# Patient Record
Sex: MALE | Race: WHITE | NOT HISPANIC OR LATINO
[De-identification: names, ages, dates, MRNs, and addresses within clinical notes are randomized per-mention and may not be internally consistent; named-entity substitution may affect disease eponyms.]

---
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
## 2024-08-22 ENCOUNTER — NON-APPOINTMENT (OUTPATIENT)
Age: 19
End: 2024-08-22

## 2024-08-23 ENCOUNTER — APPOINTMENT (OUTPATIENT)
Dept: DERMATOLOGY | Facility: CLINIC | Age: 19
End: 2024-08-23
Payer: COMMERCIAL

## 2024-08-23 VITALS — WEIGHT: 150 LBS | BODY MASS INDEX: 22.22 KG/M2 | HEIGHT: 69 IN

## 2024-08-23 DIAGNOSIS — D18.01 HEMANGIOMA OF SKIN AND SUBCUTANEOUS TISSUE: ICD-10-CM

## 2024-08-23 DIAGNOSIS — L64.9 ANDROGENIC ALOPECIA, UNSPECIFIED: ICD-10-CM

## 2024-08-23 DIAGNOSIS — Z12.83 ENCOUNTER FOR SCREENING FOR MALIGNANT NEOPLASM OF SKIN: ICD-10-CM

## 2024-08-23 DIAGNOSIS — D22.9 MELANOCYTIC NEVI, UNSPECIFIED: ICD-10-CM

## 2024-08-23 PROCEDURE — 99204 OFFICE O/P NEW MOD 45 MIN: CPT

## 2024-08-23 RX ORDER — MINOXIDIL 2.5 MG/1
2.5 TABLET ORAL
Qty: 30 | Refills: 2 | Status: ACTIVE | COMMUNITY
Start: 2024-08-23 | End: 1900-01-01

## 2024-08-23 NOTE — ASSESSMENT
[FreeTextEntry1] : #Androgenetic alopecia, scalp, chronic, progressive - Education and counseling; discussed diagnosis and natural disease course. AGA requires medical intervention lifelong to maintain results. Likely will need to try any of the discussed interventions for at least 4-6 months, if not longer, to see effects. - Discussed management options and realistic goals of treatment, including topical minoxidil and systemic therapy including oral minoxidil and oral finasteride - patient opts to start oral minoxidil - Start minoxidil PO 1.25mg nightly (half tablet)- if tolerating well, in 1 month can increase to 2.5mg (1 tablet) nightly. Proper medication use and side effects discussed, including dizziness, drowsiness, and edema (ankle, abdomen, face, and others). Discussed cannot be used in pregnancy. Rare cases of pericardial effusion.    # Cherry angiomas #Multiple melanocytic nevi, benign - education, counseling, reassurance - ABCDEs of melanoma reviewed, rtc sooner if changing  Full body exam today. Benign findings as above. - Patient educated on ABCDEs of melanoma screening - Photoprotection reviewed including sun-protective behaviors, protective clothing, and the use of OTC broad-spectrum SPF 30+ sunscreens was advised. - RTC if develops lesions that are new, symptomatic (bleeding, itching), changing in size/color/shape  Patient may follow up in 3 months for f/u of hair loss, or sooner PRN if any changes, new or growing lesions

## 2024-08-23 NOTE — HISTORY OF PRESENT ILLNESS
[FreeTextEntry1] : NPV: spot on back [de-identified] : Mr. ERNESTO PHILLIPS is a 18 year old M who presents for:   1. spot check: patient with non-itchy, non-painful spot on back which he has had on back x years per mom. here for evaluation 2. hair loss: patient endorses noting hair loss on frontal scalp over past x year.  Skin Cancer Hx: No personal history of skin cancer Family Hx: no family history of skin cancer

## 2024-08-23 NOTE — PHYSICAL EXAM
[FreeTextEntry3] : PE:  General: well-appearing, alert, in no acute distress  Full body skin exam performed examining scalp, head, face, ears, eyes, mouth, neck, chest, back, abdomen, axilla, buttock, b/l arms, b/l forearms, b/l hands, b/l fingernails, b/l thighs, b/l legs, b/l feet, b/l toenails. Groin and genitalia deferred per patient. Pertinent findings include: - Few fairly uniform and regular brown macules and papules on the back. dermoscopy with benign features --- On R superior back, 1.0 x 0.8cm, benign features - Few red partially blanching papules on the upper back - bitemporal decreased hair density with follicular miniaturization on dermoscopy; no scale or erythema   Genital exam declined advised to perform self exams and alert us if any unusual or changing moles

## 2024-09-03 ENCOUNTER — NON-APPOINTMENT (OUTPATIENT)
Age: 19
End: 2024-09-03

## 2024-09-04 ENCOUNTER — LABORATORY RESULT (OUTPATIENT)
Age: 19
End: 2024-09-04

## 2024-09-04 ENCOUNTER — NON-APPOINTMENT (OUTPATIENT)
Age: 19
End: 2024-09-04

## 2024-09-04 ENCOUNTER — APPOINTMENT (OUTPATIENT)
Dept: INTERNAL MEDICINE | Facility: CLINIC | Age: 19
End: 2024-09-04
Payer: COMMERCIAL

## 2024-09-04 VITALS
OXYGEN SATURATION: 99 % | HEIGHT: 68.5 IN | BODY MASS INDEX: 22.32 KG/M2 | SYSTOLIC BLOOD PRESSURE: 123 MMHG | WEIGHT: 149 LBS | DIASTOLIC BLOOD PRESSURE: 71 MMHG | HEART RATE: 73 BPM

## 2024-09-04 DIAGNOSIS — L64.9 ANDROGENIC ALOPECIA, UNSPECIFIED: ICD-10-CM

## 2024-09-04 DIAGNOSIS — Z91.018 ALLERGY TO OTHER FOODS: ICD-10-CM

## 2024-09-04 DIAGNOSIS — Z00.00 ENCOUNTER FOR GENERAL ADULT MEDICAL EXAMINATION W/OUT ABNORMAL FINDINGS: ICD-10-CM

## 2024-09-04 PROCEDURE — 93000 ELECTROCARDIOGRAM COMPLETE: CPT

## 2024-09-04 PROCEDURE — 36415 COLL VENOUS BLD VENIPUNCTURE: CPT

## 2024-09-04 PROCEDURE — 99385 PREV VISIT NEW AGE 18-39: CPT

## 2024-09-04 NOTE — HISTORY OF PRESENT ILLNESS
[de-identified] : 18 year old male presents for initial annual exam.  overall doing well.    diet- 50/50 eating home and out.  soda, juices and energy drinks exercise- play sports   single, not sexually active.  declined std today  college, senior non-[employer]

## 2024-09-04 NOTE — ASSESSMENT
[FreeTextEntry1] : //  male pattern baldness  -finasteride as directed, risks and benefits of medication discussed in detail   Healthcare Maintenance -Advise Yearly Skin cancer screening with Dermatologist  -Advise Yearly Eye exam with Ophthalmologist -Advise Yearly Dental exam -Educated of the importance of Healthy diet, such as Mediterranean Diet and Exercise, such as walking >20 minutes a day and increasing gradually as tolerated  Immunizations -Flu vaccine  -Covid vaccine

## 2024-09-04 NOTE — HEALTH RISK ASSESSMENT
[Good] : ~his/her~  mood as  good [No falls in past year] : Patient reported no falls in the past year [0] : 2) Feeling down, depressed, or hopeless: Not at all (0) [PHQ-2 Negative - No further assessment needed] : PHQ-2 Negative - No further assessment needed [Fully functional (bathing, dressing, toileting, transferring, walking, feeding)] : Fully functional (bathing, dressing, toileting, transferring, walking, feeding) [Fully functional (using the telephone, shopping, preparing meals, housekeeping, doing laundry, using] : Fully functional and needs no help or supervision to perform IADLs (using the telephone, shopping, preparing meals, housekeeping, doing laundry, using transportation, managing medications and managing finances) [Never] : Never [[id number]] : 0 [Change in mental status noted] : No change in mental status noted [Single] : single [Sexually Active] : not sexually active [High Risk Behavior] : no high risk behavior [Reports changes in hearing] : Reports no changes in hearing [Reports changes in vision] : Reports no changes in vision

## 2024-09-06 ENCOUNTER — CLINICAL ADVICE (OUTPATIENT)
Age: 19
End: 2024-09-06

## 2024-09-06 DIAGNOSIS — E55.9 VITAMIN D DEFICIENCY, UNSPECIFIED: ICD-10-CM

## 2024-09-06 LAB
25(OH)D3 SERPL-MCNC: 25.2 NG/ML
ALBUMIN SERPL ELPH-MCNC: 4.4 G/DL
ALMOND IGE QN: 8.62 KUA/L
ALP BLD-CCNC: 90 U/L
ALT SERPL-CCNC: 14 U/L
ANION GAP SERPL CALC-SCNC: 11 MMOL/L
APPEARANCE: CLEAR
APPLE IGE QN: 26.9 KUA/L
AST SERPL-CCNC: 16 U/L
BACTERIA: NEGATIVE /HPF
BANANA IGE QN: 53.6 KUA/L
BASOPHILS # BLD AUTO: 0.03 K/UL
BASOPHILS NFR BLD AUTO: 0.7 %
BILIRUB DIRECT SERPL-MCNC: 0.1 MG/DL
BILIRUB INDIRECT SERPL-MCNC: 0.2 MG/DL
BILIRUB SERPL-MCNC: 0.3 MG/DL
BILIRUBIN URINE: NEGATIVE
BLOOD URINE: NEGATIVE
BRAZIL NUT IGE QN: 1.61 KUA/L
BUN SERPL-MCNC: 15 MG/DL
CALCIUM SERPL-MCNC: 9.9 MG/DL
CASHEW NUT IGE QN: 3.05 KUA/L
CAST: 1 /LPF
CHLORIDE SERPL-SCNC: 102 MMOL/L
CHOLEST SERPL-MCNC: 187 MG/DL
CO2 SERPL-SCNC: 27 MMOL/L
COCONUT IGE QN: 11.4 KUA/L
COCONUT IGE QN: 3
CODFISH IGE QN: <0.1 KUA/L
COLOR: YELLOW
COW MILK IGE QN: 0.79 KUA/L
CREAT SERPL-MCNC: 1 MG/DL
DEPRECATED ALMOND IGE RAST QL: 3 (ref 0–?)
DEPRECATED APPLE IGE RAST QL: 4
DEPRECATED BANANA IGE RAST QL: 5
DEPRECATED BRAZIL NUT IGE RAST QL: 2 (ref 0–?)
DEPRECATED CASHEW NUT IGE RAST QL: 2 (ref 0–?)
DEPRECATED CODFISH IGE RAST QL: 0 (ref 0–?)
DEPRECATED COW MILK IGE RAST QL: 2 (ref 0–?)
DEPRECATED EGG WHITE IGE RAST QL: 2 (ref 0–?)
DEPRECATED HAZELNUT IGE RAST QL: 5 (ref 0–?)
DEPRECATED KIWIFRUIT IGE RAST QL: 27.3 KUA/L
DEPRECATED MELON IGE RAST QL: 3
DEPRECATED ORANGE IGE RAST QL: 3
DEPRECATED PEANUT IGE RAST QL: 4 (ref 0–?)
DEPRECATED SALMON IGE RAST QL: 0 (ref 0–?)
DEPRECATED SCALLOP IGE RAST QL: 0.22 KUA/L
DEPRECATED SESAME SEED IGE RAST QL: 4 (ref 0–?)
DEPRECATED SHRIMP IGE RAST QL: NORMAL (ref 0–?)
DEPRECATED SOYBEAN IGE RAST QL: 3 (ref 0–?)
DEPRECATED STRAWBERRY IGE RAST QL: 3
DEPRECATED TUNA IGE RAST QL: NORMAL (ref 0–?)
DEPRECATED WALNUT IGE RAST QL: 4 (ref 0–?)
DEPRECATED WHEAT IGE RAST QL: 3 (ref 0–?)
EGFR: 110 ML/MIN/1.73M2
EGG WHITE IGE QN: 1.22 KUA/L
EOSINOPHIL # BLD AUTO: 0.31 K/UL
EOSINOPHIL NFR BLD AUTO: 7.3 %
EPITHELIAL CELLS: 0 /HPF
ESTIMATED AVERAGE GLUCOSE: 105 MG/DL
FERRITIN SERPL-MCNC: 146 NG/ML
FOLATE SERPL-MCNC: 4.2 NG/ML
GLUCOSE QUALITATIVE U: NEGATIVE MG/DL
GLUCOSE SERPL-MCNC: 99 MG/DL
HAZELNUT IGE QN: 81.4 KUA/L
HBA1C MFR BLD HPLC: 5.3 %
HCT VFR BLD CALC: 42.3 %
HDLC SERPL-MCNC: 52 MG/DL
HGB BLD-MCNC: 15.2 G/DL
IMM GRANULOCYTES NFR BLD AUTO: 0.7 %
IRON SATN MFR SERPL: 23 %
IRON SERPL-MCNC: 59 UG/DL
KETONES URINE: ABNORMAL MG/DL
KIWIFRUIT IGE QN: 4
LDLC SERPL CALC-MCNC: 96 MG/DL
LEUKOCYTE ESTERASE URINE: NEGATIVE
LYMPHOCYTES # BLD AUTO: 1.3 K/UL
LYMPHOCYTES NFR BLD AUTO: 30.4 %
MAN DIFF?: NORMAL
MCHC RBC-ENTMCNC: 30.7 PG
MCHC RBC-ENTMCNC: 35.9 GM/DL
MCV RBC AUTO: 85.5 FL
MELON IGE QN: 11.1 KUA/L
MICROSCOPIC-UA: NORMAL
MONOCYTES # BLD AUTO: 0.3 K/UL
MONOCYTES NFR BLD AUTO: 7 %
NEUTROPHILS # BLD AUTO: 2.3 K/UL
NEUTROPHILS NFR BLD AUTO: 53.9 %
NITRITE URINE: NEGATIVE
NONHDLC SERPL-MCNC: 135 MG/DL
ORANGE IGE QN: 6.03 KUA/L
PEANUT IGE QN: 37.2 KUA/L
PH URINE: 6
PLATELET # BLD AUTO: 228 K/UL
POTASSIUM SERPL-SCNC: 3.8 MMOL/L
PROT SERPL-MCNC: 7.2 G/DL
PROTEIN URINE: NORMAL MG/DL
RBC # BLD: 4.95 M/UL
RBC # FLD: 12 %
RED BLOOD CELLS URINE: 1 /HPF
SALMON IGE QN: <0.1 KUA/L
SCALLOP IGE QN: 0.16 KUA/L
SCALLOP IGE QN: NORMAL (ref 0–?)
SESAME SEED IGE QN: 36.6 KUA/L
SODIUM SERPL-SCNC: 140 MMOL/L
SOYBEAN IGE QN: 9.34 KUA/L
SPECIFIC GRAVITY URINE: >1.03
STRAWBERRY IGE QN: 9.64 KUA/L
TIBC SERPL-MCNC: 260 UG/DL
TOTAL IGE SMQN RAST: 1217 KU/L
TRIGL SERPL-MCNC: 231 MG/DL
TSH SERPL-ACNC: 1.92 UIU/ML
TUNA IGE QN: 0.15 KUA/L
UIBC SERPL-MCNC: 201 UG/DL
UROBILINOGEN URINE: 1 MG/DL
VIT B12 SERPL-MCNC: 460 PG/ML
WALNUT IGE QN: 19.2 KUA/L
WBC # FLD AUTO: 4.27 K/UL
WHEAT IGE QN: 8.16 KUA/L
WHITE BLOOD CELLS URINE: 0 /HPF

## 2024-09-06 RX ORDER — FINASTERIDE 1 MG/1
1 TABLET ORAL DAILY
Qty: 90 | Refills: 1 | Status: ACTIVE | OUTPATIENT
Start: 2024-09-04

## 2024-09-19 ENCOUNTER — CLINICAL ADVICE (OUTPATIENT)
Age: 19
End: 2024-09-19

## 2024-10-14 ENCOUNTER — APPOINTMENT (OUTPATIENT)
Dept: ALLERGY | Facility: CLINIC | Age: 19
End: 2024-10-14
Payer: COMMERCIAL

## 2024-10-14 ENCOUNTER — NON-APPOINTMENT (OUTPATIENT)
Age: 19
End: 2024-10-14

## 2024-10-14 PROCEDURE — 95004 PERQ TESTS W/ALRGNC XTRCS: CPT

## 2024-10-14 PROCEDURE — 99204 OFFICE O/P NEW MOD 45 MIN: CPT | Mod: 25

## 2024-10-14 RX ORDER — EPINEPHRINE 0.3 MG/.3ML
0.3 INJECTION INTRAMUSCULAR
Qty: 2 | Refills: 0 | Status: ACTIVE | COMMUNITY
Start: 2024-10-14 | End: 1900-01-01

## 2024-10-15 VITALS
SYSTOLIC BLOOD PRESSURE: 110 MMHG | DIASTOLIC BLOOD PRESSURE: 72 MMHG | HEART RATE: 88 BPM | RESPIRATION RATE: 14 BRPM | TEMPERATURE: 208.94 F

## 2025-03-10 DIAGNOSIS — L64.9 ANDROGENIC ALOPECIA, UNSPECIFIED: ICD-10-CM

## 2025-03-10 RX ORDER — MINOXIDIL 2.5 MG/1
2.5 TABLET ORAL
Qty: 90 | Refills: 1 | Status: ACTIVE | COMMUNITY
Start: 2025-03-10 | End: 1900-01-01

## 2025-09-04 ENCOUNTER — NON-APPOINTMENT (OUTPATIENT)
Age: 20
End: 2025-09-04

## 2025-09-04 ENCOUNTER — APPOINTMENT (OUTPATIENT)
Dept: OTOLARYNGOLOGY | Facility: CLINIC | Age: 20
End: 2025-09-04
Payer: COMMERCIAL

## 2025-09-04 VITALS
HEIGHT: 69 IN | SYSTOLIC BLOOD PRESSURE: 114 MMHG | WEIGHT: 150 LBS | BODY MASS INDEX: 22.22 KG/M2 | OXYGEN SATURATION: 99 % | HEART RATE: 59 BPM | DIASTOLIC BLOOD PRESSURE: 81 MMHG

## 2025-09-04 DIAGNOSIS — S09.92XS UNSPECIFIED INJURY OF NOSE, SEQUELA: ICD-10-CM

## 2025-09-04 PROCEDURE — 99203 OFFICE O/P NEW LOW 30 MIN: CPT
